# Patient Record
Sex: FEMALE | Race: WHITE | NOT HISPANIC OR LATINO | URBAN - METROPOLITAN AREA
[De-identification: names, ages, dates, MRNs, and addresses within clinical notes are randomized per-mention and may not be internally consistent; named-entity substitution may affect disease eponyms.]

---

## 2021-06-14 ENCOUNTER — OFFICE VISIT (OUTPATIENT)
Dept: URBAN - METROPOLITAN AREA CLINIC 25 | Facility: CLINIC | Age: 51
End: 2021-06-14

## 2021-06-30 ENCOUNTER — OFFICE VISIT (OUTPATIENT)
Dept: URBAN - METROPOLITAN AREA CLINIC 92 | Facility: CLINIC | Age: 51
End: 2021-06-30
Payer: COMMERCIAL

## 2021-06-30 VITALS
HEIGHT: 64 IN | DIASTOLIC BLOOD PRESSURE: 85 MMHG | TEMPERATURE: 97 F | BODY MASS INDEX: 22.2 KG/M2 | HEART RATE: 87 BPM | SYSTOLIC BLOOD PRESSURE: 133 MMHG | WEIGHT: 130 LBS

## 2021-06-30 DIAGNOSIS — Z83.71 FAMILY HISTORY OF COLONIC POLYPS: ICD-10-CM

## 2021-06-30 DIAGNOSIS — K62.5 RECTAL BLEEDING: ICD-10-CM

## 2021-06-30 DIAGNOSIS — K59.01 SLOW TRANSIT CONSTIPATION: ICD-10-CM

## 2021-06-30 PROCEDURE — 99204 OFFICE O/P NEW MOD 45 MIN: CPT | Performed by: INTERNAL MEDICINE

## 2021-06-30 NOTE — HPI-TODAY'S VISIT:
This is a 51-year-old  female presents today for of a couple days she does have significant straining and intermittent blood on the tissue paper.  She has not previously had a colonoscopy.  She does have a family history of colon polyps.  She has tried increasing her fiber supplementation with little relief.

## 2021-08-19 ENCOUNTER — TELEPHONE ENCOUNTER (OUTPATIENT)
Dept: URBAN - METROPOLITAN AREA CLINIC 92 | Facility: CLINIC | Age: 51
End: 2021-08-19

## 2021-08-19 ENCOUNTER — TELEPHONE ENCOUNTER (OUTPATIENT)
Dept: URBAN - METROPOLITAN AREA CLINIC 40 | Facility: CLINIC | Age: 51
End: 2021-08-19

## 2021-08-20 ENCOUNTER — OFFICE VISIT (OUTPATIENT)
Dept: URBAN - METROPOLITAN AREA SURGERY CENTER 16 | Facility: SURGERY CENTER | Age: 51
End: 2021-08-20

## 2021-08-25 ENCOUNTER — TELEPHONE ENCOUNTER (OUTPATIENT)
Dept: URBAN - METROPOLITAN AREA CLINIC 92 | Facility: CLINIC | Age: 51
End: 2021-08-25

## 2021-08-25 PROBLEM — 35298007: Status: ACTIVE | Noted: 2021-06-30

## 2021-08-27 ENCOUNTER — OFFICE VISIT (OUTPATIENT)
Dept: URBAN - METROPOLITAN AREA SURGERY CENTER 30 | Facility: SURGERY CENTER | Age: 51
End: 2021-08-27

## 2021-08-27 ENCOUNTER — CLAIMS CREATED FROM THE CLAIM WINDOW (OUTPATIENT)
Dept: URBAN - METROPOLITAN AREA CLINIC 4 | Facility: CLINIC | Age: 51
End: 2021-08-27
Payer: COMMERCIAL

## 2021-08-27 ENCOUNTER — OFFICE VISIT (OUTPATIENT)
Dept: URBAN - METROPOLITAN AREA SURGERY CENTER 16 | Facility: SURGERY CENTER | Age: 51
End: 2021-08-27
Payer: COMMERCIAL

## 2021-08-27 DIAGNOSIS — D12.2 ADENOMA OF ASCENDING COLON: ICD-10-CM

## 2021-08-27 DIAGNOSIS — Z83.71 FAMILY HISTORY OF COLON POLYPS: ICD-10-CM

## 2021-08-27 DIAGNOSIS — D12.2 BENIGN NEOPLASM OF ASCENDING COLON: ICD-10-CM

## 2021-08-27 PROCEDURE — 88305 TISSUE EXAM BY PATHOLOGIST: CPT | Performed by: PATHOLOGY

## 2021-08-27 PROCEDURE — 45385 COLONOSCOPY W/LESION REMOVAL: CPT | Performed by: INTERNAL MEDICINE

## 2021-08-27 PROCEDURE — G8907 PT DOC NO EVENTS ON DISCHARG: HCPCS | Performed by: INTERNAL MEDICINE

## 2021-08-30 ENCOUNTER — OFFICE VISIT (OUTPATIENT)
Dept: URBAN - METROPOLITAN AREA CLINIC 92 | Facility: CLINIC | Age: 51
End: 2021-08-30

## 2021-09-24 ENCOUNTER — OFFICE VISIT (OUTPATIENT)
Dept: URBAN - METROPOLITAN AREA CLINIC 92 | Facility: CLINIC | Age: 51
End: 2021-09-24
Payer: COMMERCIAL

## 2021-09-24 DIAGNOSIS — K64.0 GRADE I INTERNAL HEMORRHOIDS: ICD-10-CM

## 2021-09-24 PROCEDURE — 46221 LIGATION OF HEMORRHOID(S): CPT | Performed by: INTERNAL MEDICINE

## 2021-09-24 NOTE — HPI-TODAY'S VISIT:
Pt seen for recent colonoscopy with small adenoma resected and IH grade I. Mild rectal bleeding at times.

## 2021-10-12 ENCOUNTER — OFFICE VISIT (OUTPATIENT)
Dept: URBAN - METROPOLITAN AREA CLINIC 92 | Facility: CLINIC | Age: 51
End: 2021-10-12
Payer: COMMERCIAL

## 2021-10-12 ENCOUNTER — TELEPHONE ENCOUNTER (OUTPATIENT)
Dept: URBAN - METROPOLITAN AREA CLINIC 40 | Facility: CLINIC | Age: 51
End: 2021-10-12

## 2021-10-12 ENCOUNTER — TELEPHONE ENCOUNTER (OUTPATIENT)
Dept: URBAN - METROPOLITAN AREA CLINIC 92 | Facility: CLINIC | Age: 51
End: 2021-10-12

## 2021-10-12 VITALS
BODY MASS INDEX: 23.39 KG/M2 | DIASTOLIC BLOOD PRESSURE: 82 MMHG | HEART RATE: 80 BPM | SYSTOLIC BLOOD PRESSURE: 142 MMHG | HEIGHT: 64 IN | TEMPERATURE: 98 F | WEIGHT: 137 LBS

## 2021-10-12 DIAGNOSIS — K64.9 HEMORRHOIDS WITHOUT COMPLICATION: ICD-10-CM

## 2021-10-12 PROCEDURE — 46221 LIGATION OF HEMORRHOID(S): CPT | Performed by: INTERNAL MEDICINE

## 2021-11-01 ENCOUNTER — OFFICE VISIT (OUTPATIENT)
Dept: URBAN - METROPOLITAN AREA CLINIC 92 | Facility: CLINIC | Age: 51
End: 2021-11-01
Payer: COMMERCIAL

## 2021-11-01 VITALS
DIASTOLIC BLOOD PRESSURE: 85 MMHG | TEMPERATURE: 97.9 F | HEIGHT: 64 IN | BODY MASS INDEX: 24.41 KG/M2 | WEIGHT: 143 LBS | HEART RATE: 82 BPM | SYSTOLIC BLOOD PRESSURE: 130 MMHG

## 2021-11-01 DIAGNOSIS — K64.0 GRADE I HEMORRHOIDS: ICD-10-CM

## 2021-11-01 PROCEDURE — 46221 LIGATION OF HEMORRHOID(S): CPT | Performed by: INTERNAL MEDICINE

## 2021-11-22 ENCOUNTER — OFFICE VISIT (OUTPATIENT)
Dept: URBAN - METROPOLITAN AREA CLINIC 92 | Facility: CLINIC | Age: 51
End: 2021-11-22
Payer: COMMERCIAL

## 2021-11-22 DIAGNOSIS — K64.0 GRADE I HEMORRHOIDS: ICD-10-CM

## 2021-11-22 PROCEDURE — 46221 LIGATION OF HEMORRHOID(S): CPT | Performed by: INTERNAL MEDICINE

## 2023-04-20 ENCOUNTER — WEB ENCOUNTER (OUTPATIENT)
Dept: URBAN - METROPOLITAN AREA CLINIC 92 | Facility: CLINIC | Age: 53
End: 2023-04-20

## 2023-04-20 ENCOUNTER — OFFICE VISIT (OUTPATIENT)
Dept: URBAN - METROPOLITAN AREA CLINIC 92 | Facility: CLINIC | Age: 53
End: 2023-04-20
Payer: COMMERCIAL

## 2023-04-20 VITALS
BODY MASS INDEX: 24.07 KG/M2 | SYSTOLIC BLOOD PRESSURE: 121 MMHG | WEIGHT: 141 LBS | HEART RATE: 80 BPM | DIASTOLIC BLOOD PRESSURE: 80 MMHG | HEIGHT: 64 IN | TEMPERATURE: 97.2 F

## 2023-04-20 DIAGNOSIS — K64.8 INTERNAL HEMORRHOID: ICD-10-CM

## 2023-04-20 DIAGNOSIS — R19.7 DIARRHEA, UNSPECIFIED TYPE: ICD-10-CM

## 2023-04-20 PROCEDURE — 99214 OFFICE O/P EST MOD 30 MIN: CPT

## 2023-04-20 RX ORDER — CHOLESTYRAMINE 4 G/9G
1 PACKET MIXED WITH WATER OR NON-CARBONATED DRINK POWDER, FOR SUSPENSION ORAL ONCE A DAY
Qty: 30 | OUTPATIENT
Start: 2023-04-20

## 2023-04-20 NOTE — PHYSICAL EXAM GASTROINTESTINAL
Abdomen,  soft, nontender, nondistended,  no guarding or rigidity,  no masses palpable,  normal bowel sounds Liver and Spleen,no hepatosplenomegaly Rectal Ih present no eh, bleeding or fissures

## 2023-04-20 NOTE — HPI-TODAY'S VISIT:
53-year-old female presents today for hemorrhoids.  She had 4 hemorrhoid banding's in 2021 with Dr. Davenport.  Her last colonoscopy was 8-2021 and this demonstrated grade 1 internal hemorrhoids, 8 mm sessile serrated adenoma otherwise normal repeat in 5 years  Patient states for the past 6 months she has been having looser bowel movements alternating with constipation which has caused her hemorrhoids to flare again.  She states she will have bowel movements every 2 hours some days and then have constipation episodes.  She has had nighttime symptoms about 1-2 times and symptoms started.  She also has a sense of urgency.  No new medications or antibiotics since onset of symptoms.  She did try Pepto-Bismol which did not help.  Hemorrhoids have been prolapsing and patient has had to manually reduce these.  She notes occasional blood when she wipes as well.  She has no melena, weight loss.  She denies any abdominal pain, nausea, vomiting, fever, chills.  No upper GI complaints.  She uses NSAIDs about once a week.  She has increased her NSAID use recently due to an ankle sprain.  No family history of any GI related cancers. Recent labs viewed on phone today were normal including TSH.

## 2023-04-21 LAB
IMMUNOGLOBULIN A, QN, SERUM: 200
INTERPRETATION: (no result)
T-TRANSGLUTAMINASE (TTG) IGA: <1

## 2023-04-27 LAB
CALPROTECTIN, FECAL: 6
GIARDIA AG, EIA, STOOL: (no result)
OVA AND PARASITES, CONC AND PERM SMEAR: (no result)

## 2023-05-03 ENCOUNTER — OFFICE VISIT (OUTPATIENT)
Dept: URBAN - METROPOLITAN AREA CLINIC 92 | Facility: CLINIC | Age: 53
End: 2023-05-03
Payer: COMMERCIAL

## 2023-05-03 ENCOUNTER — TELEPHONE ENCOUNTER (OUTPATIENT)
Dept: URBAN - METROPOLITAN AREA CLINIC 35 | Facility: CLINIC | Age: 53
End: 2023-05-03

## 2023-05-03 VITALS
BODY MASS INDEX: 24.24 KG/M2 | HEART RATE: 75 BPM | WEIGHT: 142 LBS | TEMPERATURE: 97.1 F | DIASTOLIC BLOOD PRESSURE: 84 MMHG | HEIGHT: 64 IN | SYSTOLIC BLOOD PRESSURE: 129 MMHG

## 2023-05-03 DIAGNOSIS — K64.1 GRADE II HEMORRHOIDS: ICD-10-CM

## 2023-05-03 DIAGNOSIS — R19.7 DIARRHEA, UNSPECIFIED TYPE: ICD-10-CM

## 2023-05-03 PROCEDURE — 46221 LIGATION OF HEMORRHOID(S): CPT | Performed by: INTERNAL MEDICINE

## 2023-05-03 PROCEDURE — 25 SEPARATE E/M: Performed by: INTERNAL MEDICINE

## 2023-05-03 PROCEDURE — 99214 OFFICE O/P EST MOD 30 MIN: CPT | Performed by: INTERNAL MEDICINE

## 2023-05-03 RX ORDER — CHOLESTYRAMINE 4 G/9G
1 PACKET MIXED WITH WATER OR NON-CARBONATED DRINK POWDER, FOR SUSPENSION ORAL ONCE A DAY
Qty: 30 | Status: ACTIVE | COMMUNITY
Start: 2023-04-20

## 2023-05-17 ENCOUNTER — ERX REFILL RESPONSE (OUTPATIENT)
Dept: URBAN - METROPOLITAN AREA CLINIC 92 | Facility: CLINIC | Age: 53
End: 2023-05-17

## 2023-05-17 RX ORDER — CHOLESTYRAMINE POWDER FOR SUSPENSION 4 G/8.78G
TAKE 1 PACKET MIXED WITH WATER OR NON-CARBONATED DRINK BY MOUTH ONCE A DAY POWDER, FOR SUSPENSION ORAL
Qty: 30 PACK | Refills: 0 | OUTPATIENT

## 2023-05-17 RX ORDER — CHOLESTYRAMINE 4 G/9G
1 PACKET MIXED WITH WATER OR NON-CARBONATED DRINK POWDER, FOR SUSPENSION ORAL ONCE A DAY
Qty: 30 | OUTPATIENT

## 2023-05-19 ENCOUNTER — OFFICE VISIT (OUTPATIENT)
Dept: URBAN - METROPOLITAN AREA CLINIC 92 | Facility: CLINIC | Age: 53
End: 2023-05-19
Payer: COMMERCIAL

## 2023-05-19 VITALS
TEMPERATURE: 97.9 F | HEIGHT: 64 IN | WEIGHT: 141 LBS | DIASTOLIC BLOOD PRESSURE: 78 MMHG | SYSTOLIC BLOOD PRESSURE: 117 MMHG | BODY MASS INDEX: 24.07 KG/M2 | HEART RATE: 73 BPM

## 2023-05-19 DIAGNOSIS — R19.7 DIARRHEA, UNSPECIFIED TYPE: ICD-10-CM

## 2023-05-19 DIAGNOSIS — K64.1 GRADE II HEMORRHOIDS: ICD-10-CM

## 2023-05-19 PROCEDURE — 99213 OFFICE O/P EST LOW 20 MIN: CPT | Performed by: INTERNAL MEDICINE

## 2023-05-19 PROCEDURE — 46221 LIGATION OF HEMORRHOID(S): CPT | Performed by: INTERNAL MEDICINE

## 2023-05-19 PROCEDURE — 25 SEPARATE E/M: Performed by: INTERNAL MEDICINE

## 2023-05-19 RX ORDER — CHOLESTYRAMINE POWDER FOR SUSPENSION 4 G/8.78G
TAKE 1 PACKET MIXED WITH WATER OR NON-CARBONATED DRINK BY MOUTH ONCE A DAY POWDER, FOR SUSPENSION ORAL
Qty: 30 PACK | Refills: 0 | Status: ACTIVE | COMMUNITY

## 2023-05-19 NOTE — HPI-TODAY'S VISIT:
53-year-old female presents today for hemorrhoids.  She had 4 hemorrhoid banding's in 2021 with Dr. Davenport.  Her last colonoscopy was 8-2021 and this demonstrated grade 1 internal hemorrhoids, 8 mm sessile serrated adenoma otherwise normal repeat in 5 years  Patient states for the past 6 months she has been having looser bowel movements alternating with constipation which has caused her hemorrhoids to flare again.  She states she will have bowel movements every 2 hours some days and then have constipation episodes.  She has had nighttime symptoms about 1-2 times and symptoms started.  She also has a sense of urgency.  No new medications or antibiotics since onset of symptoms.  She did try Pepto-Bismol which did not help.  Hemorrhoids have been prolapsing and patient has had to manually reduce these.  She notes occasional blood when she wipes as well.  She has no melena, weight loss.  She denies any abdominal pain, nausea, vomiting, fever, chills.  No upper GI complaints.  She uses NSAIDs about once a week.  She has increased her NSAID use recently due to an ankle sprain.  No family history of any GI related cancers. Recent labs viewed on phone today were normal including TSH.  ***5/3/23: Stool studies negative for infectious etiology, fecal calprotectin normal, celiac normal.  Still having loose stool with undigested material.  Questran helps, taking once per day.  ***5/19/23: Still having loose stool, but admittedly is not very compliant with Questran and notes that it does improve when she takes it. Wondering if she must take it indefinitely.

## 2023-06-01 ENCOUNTER — OFFICE VISIT (OUTPATIENT)
Dept: URBAN - METROPOLITAN AREA CLINIC 92 | Facility: CLINIC | Age: 53
End: 2023-06-01

## 2023-06-12 ENCOUNTER — OFFICE VISIT (OUTPATIENT)
Dept: URBAN - METROPOLITAN AREA CLINIC 92 | Facility: CLINIC | Age: 53
End: 2023-06-12
Payer: COMMERCIAL

## 2023-06-12 VITALS
SYSTOLIC BLOOD PRESSURE: 143 MMHG | HEIGHT: 64 IN | WEIGHT: 142.6 LBS | BODY MASS INDEX: 24.34 KG/M2 | DIASTOLIC BLOOD PRESSURE: 94 MMHG | HEART RATE: 69 BPM | TEMPERATURE: 96.8 F

## 2023-06-12 DIAGNOSIS — K58.0 IRRITABLE BOWEL SYNDROME WITH DIARRHEA: ICD-10-CM

## 2023-06-12 DIAGNOSIS — K64.1 GRADE II HEMORRHOIDS: ICD-10-CM

## 2023-06-12 PROCEDURE — 46221 LIGATION OF HEMORRHOID(S): CPT | Performed by: INTERNAL MEDICINE

## 2023-06-12 PROCEDURE — 99214 OFFICE O/P EST MOD 30 MIN: CPT | Performed by: INTERNAL MEDICINE

## 2023-06-12 PROCEDURE — 25 SEPARATE E/M: Performed by: INTERNAL MEDICINE

## 2023-06-12 RX ORDER — CHOLESTYRAMINE POWDER FOR SUSPENSION 4 G/8.78G
TAKE 1 PACKET MIXED WITH WATER OR NON-CARBONATED DRINK BY MOUTH ONCE A DAY POWDER, FOR SUSPENSION ORAL
Qty: 180 PACKETS | Refills: 3

## 2023-06-12 RX ORDER — DICYCLOMINE HYDROCHLORIDE 10 MG/1
1-2 TABLETS CAPSULE ORAL
Qty: 120 | Refills: 11 | OUTPATIENT
Start: 2023-06-12 | End: 2024-06-06

## 2023-06-12 RX ORDER — CHOLESTYRAMINE POWDER FOR SUSPENSION 4 G/8.78G
TAKE 1 PACKET MIXED WITH WATER OR NON-CARBONATED DRINK BY MOUTH ONCE A DAY POWDER, FOR SUSPENSION ORAL
Qty: 30 PACK | Refills: 0 | Status: ACTIVE | COMMUNITY

## 2023-06-12 NOTE — HPI-TODAY'S VISIT:
53-year-old female presents today for hemorrhoids.  She had 4 hemorrhoid banding's in 2021 with Dr. Davenport.  Her last colonoscopy was 8-2021 and this demonstrated grade 1 internal hemorrhoids, 8 mm sessile serrated adenoma otherwise normal repeat in 5 years  Patient states for the past 6 months she has been having looser bowel movements alternating with constipation which has caused her hemorrhoids to flare again.  She states she will have bowel movements every 2 hours some days and then have constipation episodes.  She has had nighttime symptoms about 1-2 times and symptoms started.  She also has a sense of urgency.  No new medications or antibiotics since onset of symptoms.  She did try Pepto-Bismol which did not help.  Hemorrhoids have been prolapsing and patient has had to manually reduce these.  She notes occasional blood when she wipes as well.  She has no melena, weight loss.  She denies any abdominal pain, nausea, vomiting, fever, chills.  No upper GI complaints.  She uses NSAIDs about once a week.  She has increased her NSAID use recently due to an ankle sprain.  No family history of any GI related cancers. Recent labs viewed on phone today were normal including TSH.  ***5/3/23: Stool studies negative for infectious etiology, fecal calprotectin normal, celiac normal.  Still having loose stool with undigested material.  Questran helps, taking once per day.  ***5/19/23: Still having loose stool, but admittedly is not very compliant with Questran and notes that it does improve when she takes it. Wondering if she must take it indefinitely.  ***6/12/23: Having lower abdominal discomfort, yesterday had to lay in bed. Takes Questran about once/day.

## 2023-06-22 ENCOUNTER — ERX REFILL RESPONSE (OUTPATIENT)
Dept: URBAN - METROPOLITAN AREA CLINIC 92 | Facility: CLINIC | Age: 53
End: 2023-06-22

## 2023-06-22 RX ORDER — CHOLESTYRAMINE POWDER FOR SUSPENSION 4 G/8.78G
TAKE 1 PACKET MIXED WITH WATER OR NON-CARBONATED DRINK BY MOUTH ONCE A DAY POWDER, FOR SUSPENSION ORAL
Qty: 180 PACKETS | Refills: 3 | OUTPATIENT

## 2023-06-22 RX ORDER — CHOLESTYRAMINE 4 G/9G
TAKE 1 PACKET MIXED WITH WATER OR NON-CARBONATED DRINK BY MOUTH ONCE A DAY POWDER, FOR SUSPENSION ORAL
Qty: 30 PACKET | Refills: 0 | OUTPATIENT

## 2023-08-02 ENCOUNTER — LAB OUTSIDE AN ENCOUNTER (OUTPATIENT)
Dept: URBAN - METROPOLITAN AREA CLINIC 92 | Facility: CLINIC | Age: 53
End: 2023-08-02

## 2023-08-02 ENCOUNTER — DASHBOARD ENCOUNTERS (OUTPATIENT)
Age: 53
End: 2023-08-02

## 2023-08-02 ENCOUNTER — OFFICE VISIT (OUTPATIENT)
Dept: URBAN - METROPOLITAN AREA CLINIC 92 | Facility: CLINIC | Age: 53
End: 2023-08-02
Payer: COMMERCIAL

## 2023-08-02 VITALS
HEART RATE: 71 BPM | HEIGHT: 64 IN | DIASTOLIC BLOOD PRESSURE: 79 MMHG | WEIGHT: 139 LBS | SYSTOLIC BLOOD PRESSURE: 125 MMHG | BODY MASS INDEX: 23.73 KG/M2 | TEMPERATURE: 97.7 F

## 2023-08-02 DIAGNOSIS — Z83.71 FAMILY HISTORY OF COLONIC POLYPS: ICD-10-CM

## 2023-08-02 DIAGNOSIS — K58.0 IRRITABLE BOWEL SYNDROME WITH DIARRHEA: ICD-10-CM

## 2023-08-02 DIAGNOSIS — K64.1 GRADE II HEMORRHOIDS: ICD-10-CM

## 2023-08-02 DIAGNOSIS — R19.7 DIARRHEA, UNSPECIFIED TYPE: ICD-10-CM

## 2023-08-02 PROCEDURE — 99214 OFFICE O/P EST MOD 30 MIN: CPT | Performed by: INTERNAL MEDICINE

## 2023-08-02 RX ORDER — DICYCLOMINE HYDROCHLORIDE 10 MG/1
1-2 TABLETS CAPSULE ORAL
Qty: 120 | Refills: 11 | Status: ACTIVE | COMMUNITY
Start: 2023-06-12 | End: 2024-06-06

## 2023-08-02 RX ORDER — CHOLESTYRAMINE 4 G/9G
TAKE 1 PACKET MIXED WITH WATER OR NON-CARBONATED DRINK BY MOUTH ONCE A DAY POWDER, FOR SUSPENSION ORAL
Qty: 30 PACKET | Refills: 0 | Status: ACTIVE | COMMUNITY

## 2023-08-02 NOTE — HPI-TODAY'S VISIT:
53-year-old female presents today for hemorrhoids.  She had 4 hemorrhoid banding's in  with Dr. Davenport.  Her last colonoscopy was  and this demonstrated grade 1 internal hemorrhoids, 8 mm sessile serrated adenoma otherwise normal repeat in 5 years  Patient states for the past 6 months she has been having looser bowel movements alternating with constipation which has caused her hemorrhoids to flare again.  She states she will have bowel movements every 2 hours some days and then have constipation episodes.  She has had nighttime symptoms about 1-2 times and symptoms started.  She also has a sense of urgency.  No new medications or antibiotics since onset of symptoms.  She did try Pepto-Bismol which did not help.  Hemorrhoids have been prolapsing and patient has had to manually reduce these.  She notes occasional blood when she wipes as well.  She has no melena, weight loss.  She denies any abdominal pain, nausea, vomiting, fever, chills.  No upper GI complaints.  She uses NSAIDs about once a week.  She has increased her NSAID use recently due to an ankle sprain.  No family history of any GI related cancers. Recent labs viewed on phone today were normal including TSH.  ***5/3/23: Stool studies negative for infectious etiology, fecal calprotectin normal, celiac normal.  Still having loose stool with undigested material.  Questran helps, taking once per day.  ***23: Still having loose stool, but admittedly is not very compliant with Questran and notes that it does improve when she takes it. Wondering if she must take it indefinitely.  ***23: Having lower abdominal discomfort, yesterday had to lay in bed. Takes Questran about once/day.  ***23: Having persistent diarrhea, despite Questran. BMs 5-6 times per day. Mom  last week, has been stressed.

## 2023-08-08 LAB
ADENOVIRUS F 40/41: NOT DETECTED
CAMPYLOBACTER: NOT DETECTED
CLOSTRIDIUM DIFFICILE: NOT DETECTED
ENTAMOEBA HISTOLYTICA: NOT DETECTED
ENTEROAGGREGATIVE E.COLI: NOT DETECTED
ENTEROTOXIGENIC E.COLI: NOT DETECTED
ESCHERICHIA COLI O157: NOT DETECTED
GIARDIA LAMBLIA: NOT DETECTED
NOROVIRUS GI/GII: NOT DETECTED
ROTAVIRUS A: NOT DETECTED
SALMONELLA SPP.: NOT DETECTED
SHIGA-LIKE TOXIN PRODUCING E.COLI: NOT DETECTED
SHIGELLA SPP. / ENTEROINVASIVE E.COLI: NOT DETECTED
VIBRIO PARAHAEMOLYTICUS: NOT DETECTED
VIBRIO SPP.: NOT DETECTED
YERSINIA ENTEROCOLITICA: NOT DETECTED

## 2023-08-12 LAB — PANCREATIC ELASTASE, FECAL: >500

## 2025-04-10 ENCOUNTER — P2P PATIENT RECORD (OUTPATIENT)
Age: 55
End: 2025-04-10